# Patient Record
Sex: MALE | Race: WHITE | NOT HISPANIC OR LATINO | Employment: FULL TIME | URBAN - METROPOLITAN AREA
[De-identification: names, ages, dates, MRNs, and addresses within clinical notes are randomized per-mention and may not be internally consistent; named-entity substitution may affect disease eponyms.]

---

## 2023-11-17 PROBLEM — I35.1 AORTIC VALVE REGURGITATION: Status: ACTIVE | Noted: 2022-04-19

## 2023-11-17 PROBLEM — I34.0 MITRAL VALVE REGURGITATION: Status: ACTIVE | Noted: 2022-04-19

## 2024-06-03 PROBLEM — R06.02 SOB (SHORTNESS OF BREATH): Status: ACTIVE | Noted: 2024-06-03

## 2024-06-17 ENCOUNTER — OFFICE VISIT (OUTPATIENT)
Dept: UROLOGY | Facility: CLINIC | Age: 64
End: 2024-06-17
Payer: COMMERCIAL

## 2024-06-17 VITALS — BODY MASS INDEX: 23.28 KG/M2 | WEIGHT: 162.25 LBS

## 2024-06-17 DIAGNOSIS — R35.1 NOCTURIA: ICD-10-CM

## 2024-06-17 DIAGNOSIS — N52.01 ERECTILE DYSFUNCTION DUE TO ARTERIAL INSUFFICIENCY: Primary | ICD-10-CM

## 2024-06-17 DIAGNOSIS — N50.819 TESTICULAR DISCOMFORT: ICD-10-CM

## 2024-06-17 PROCEDURE — 1126F AMNT PAIN NOTED NONE PRSNT: CPT | Mod: CPTII,S$GLB,,

## 2024-06-17 PROCEDURE — 1160F RVW MEDS BY RX/DR IN RCRD: CPT | Mod: CPTII,S$GLB,,

## 2024-06-17 PROCEDURE — 3288F FALL RISK ASSESSMENT DOCD: CPT | Mod: CPTII,S$GLB,,

## 2024-06-17 PROCEDURE — 1101F PT FALLS ASSESS-DOCD LE1/YR: CPT | Mod: CPTII,S$GLB,,

## 2024-06-17 PROCEDURE — 1159F MED LIST DOCD IN RCRD: CPT | Mod: CPTII,S$GLB,,

## 2024-06-17 PROCEDURE — 3008F BODY MASS INDEX DOCD: CPT | Mod: CPTII,S$GLB,,

## 2024-06-17 PROCEDURE — 99203 OFFICE O/P NEW LOW 30 MIN: CPT | Mod: S$GLB,,,

## 2024-06-17 PROCEDURE — 51798 US URINE CAPACITY MEASURE: CPT | Mod: S$GLB,,,

## 2024-06-17 PROCEDURE — 3044F HG A1C LEVEL LT 7.0%: CPT | Mod: CPTII,S$GLB,,

## 2024-06-17 PROCEDURE — 99999 PR PBB SHADOW E&M-EST. PATIENT-LVL III: CPT | Mod: PBBFAC,,,

## 2024-06-17 RX ORDER — TADALAFIL 5 MG/1
5 TABLET ORAL DAILY
Qty: 30 TABLET | Refills: 11 | Status: SHIPPED | OUTPATIENT
Start: 2024-06-17 | End: 2025-06-17

## 2024-06-17 NOTE — PROGRESS NOTES
Discussed elevated BP during rounds after sedation turned off. No signs of seizure activity/twitching. Will reassess in 30 minutes and start cardene if no seizure activity     [facility] Note  Staff: SOPHIA Flanagan    PCP: MD Sakshi    Chief Complaint: ED    Subjective:        HPI: Donald Pak is a 63 y.o. male NEW PATIENT presents today for evaluation of ED. he states he has difficulty getting and maintaining an erection.  He states he has not taken any medications in the past for this.  He also states he feels a tightness in his groin and lower abdomen.  States he feels his testicles sit higher than normal.  He denies dysuria, hematuria, fever, flank pain, abdominal pain, incontinence, and difficulty urinating.  Denies constipation.  He denies a history of kidney stones.  Most recent PSA is from 11/17/2023 at 1.5.  He denies a family history of prostate cancer.    We discussed all treatment options for ED including PDE5 inhibitors, penile vacuum erection device, intracavernosal injections, and penile prosthetic.    Questions asked the pt during ov today:  Urgency: No, urge incontinence? No  NTF: 1x night  Dysuria: No  Gross Hematuria:No  Straining:No, Hesistancy:No, Intermittency:No, Weak stream:Yes - at times  ED:Yes     Last PSA Screening:   Lab Results   Component Value Date    PSA 1.5 11/17/2023       History of Kidney Stones?:  No    Constipation issues?:  No    PVR by bladder scan performed by MA today:  19 mL    REVIEW OF SYSTEMS:  Review of Systems   Constitutional: Negative.  Negative for chills and fever.   HENT: Negative.     Eyes: Negative.    Respiratory: Negative.     Cardiovascular: Negative.    Gastrointestinal: Negative.  Negative for abdominal pain, constipation, diarrhea, nausea and vomiting.   Genitourinary: Negative.  Negative for dysuria, flank pain, frequency, hematuria and urgency.   Musculoskeletal: Negative.  Negative for back pain.   Skin: Negative.    Neurological: Negative.    Endo/Heme/Allergies: Negative.    Psychiatric/Behavioral: Negative.         PMHx:  History reviewed. No pertinent past medical  history.    PSHx:  Past Surgical History:   Procedure Laterality Date    DENTAL SURGERY      HERNIA REPAIR         Fam Hx:   malignancies: No    kidney stones: No     Soc Hx:  Lives in [place]    Allergies:  Tetracyclines    Medications: reviewed     Objective:   There were no vitals filed for this visit.    Physical Exam  Constitutional:       Appearance: Normal appearance.   HENT:      Head: Normocephalic.      Mouth/Throat:      Mouth: Mucous membranes are moist.   Eyes:      Conjunctiva/sclera: Conjunctivae normal.   Pulmonary:      Effort: Pulmonary effort is normal.   Abdominal:      General: There is no distension.      Palpations: Abdomen is soft.      Tenderness: There is no abdominal tenderness.   Musculoskeletal:         General: Normal range of motion.      Cervical back: Normal range of motion.   Skin:     General: Skin is warm.   Neurological:      Mental Status: He is alert and oriented to person, place, and time.   Psychiatric:         Mood and Affect: Mood normal.         Behavior: Behavior normal.          EXAM performed by me in office today:  normal exam  No scrotal rashes, cysts or lesions  Epididymis normal in size, no tenderness  Testes normal and size, equal size bilaterally, no masses  Urethral meatus normal without discharge  Penis is circumcised    LABS REVIEW:  UA today:  urinated prior to OV    Assessment:       1. Erectile dysfunction due to arterial insufficiency    2. Nocturia    3. Testicular discomfort          Plan:     US Scrotum and testicles ordered and scheduled  Cialis 5mg daily prescribed    F/u as needed per treatment plan    [facility]: Not Active    Mounika Peterson, SOPHIA

## 2024-06-19 ENCOUNTER — HOSPITAL ENCOUNTER (OUTPATIENT)
Dept: RADIOLOGY | Facility: HOSPITAL | Age: 64
Discharge: HOME | End: 2024-06-19
Payer: COMMERCIAL

## 2024-06-19 DIAGNOSIS — N50.819 TESTICULAR DISCOMFORT: ICD-10-CM

## 2024-06-19 PROCEDURE — 93975 VASCULAR STUDY: CPT | Mod: 26,,, | Performed by: RADIOLOGY

## 2024-06-19 PROCEDURE — 93975 VASCULAR STUDY: CPT | Mod: TC,PO

## 2024-06-19 PROCEDURE — 76870 US EXAM SCROTUM: CPT | Mod: TC,PO

## 2024-06-19 PROCEDURE — 76870 US EXAM SCROTUM: CPT | Mod: 26,,, | Performed by: RADIOLOGY

## 2024-06-20 ENCOUNTER — TELEPHONE (OUTPATIENT)
Dept: UROLOGY | Facility: CLINIC | Age: 64
End: 2024-06-20
Payer: COMMERCIAL

## 2024-06-20 NOTE — TELEPHONE ENCOUNTER
----- Message from Mounika Peterson NP sent at 6/20/2024  8:39 AM CDT -----  No abnormalities seen on ultrasound to explain pain. Slight hydroceles seen bilaterally (extra fluid around testicles)

## 2024-06-28 ENCOUNTER — TELEPHONE (OUTPATIENT)
Dept: UROLOGY | Facility: CLINIC | Age: 64
End: 2024-06-28
Payer: COMMERCIAL

## 2024-06-28 NOTE — TELEPHONE ENCOUNTER
----- Message from Hermelindoradhames Zhuharjit Sims sent at 6/28/2024  3:49 PM CDT -----  Type: Needs Medical Advice  Who Called:  pt   Symptoms (please be specific):  rx concerns   Pharmacy name and phone #:    [facility] - [place] - [address], [address]  [address] [address]  [facility] [address]  Phone: [phone] Fax: [phone]  Best Call Back Number: [phone]  Additional Information: pt stated he was advised by pts pharm that in order for pt to get more than 8 tabs at a time pt needs PA asking for more than 8 at a time sent to pts ins please ensure to advise asap and call pt back with status update asap thanks!

## 2024-06-28 NOTE — TELEPHONE ENCOUNTER
Spoke with patient , advised him that jhony at patient pharmacy said that the patient requested 8 tablets of Cialis and it is not covered under his insurance and they do not have a prior authorization to be filled out as Cialis is not covered under patient insurance. Patient expressed he was told Cialis Rx would cost 12 dollars . Patient will call his pharmacy in regards to Cialis Rx . Patient expressed understanding.